# Patient Record
Sex: MALE | Race: WHITE
[De-identification: names, ages, dates, MRNs, and addresses within clinical notes are randomized per-mention and may not be internally consistent; named-entity substitution may affect disease eponyms.]

---

## 2018-12-25 ENCOUNTER — HOSPITAL ENCOUNTER (EMERGENCY)
Dept: HOSPITAL 25 - ED | Age: 68
Discharge: HOME | End: 2018-12-25
Payer: MEDICARE

## 2018-12-25 VITALS — DIASTOLIC BLOOD PRESSURE: 79 MMHG | SYSTOLIC BLOOD PRESSURE: 141 MMHG

## 2018-12-25 DIAGNOSIS — K05.10: Primary | ICD-10-CM

## 2018-12-25 DIAGNOSIS — I10: ICD-10-CM

## 2018-12-25 PROCEDURE — 99282 EMERGENCY DEPT VISIT SF MDM: CPT

## 2018-12-25 NOTE — XMS REPORT
Continuity of Care Document (CCD)

 Created on:2018



Patient:Miguel Robins

Sex:Male

:1950

External Reference #:[id number]





Demographics







 Address  [address]



   [place] [address]

 

 Mobile Phone  [phone]

 

 Email Address  [email or web address]

 

 Preferred Language  en

 

 Marital Status  Not  or 

 

 Jehovah's witness Affiliation  Unknown

 

 Race  White

 

 Ethnic Group  Not  or 









Author







 Name  iBbi Ko









Support







 Name  Relationship  Address  Phone

 

 Janice Robins  Unavailable  [address]  [phone]



     [place] [address]  









Care Team Providers







 Name  Role  Phone

 

 Dev Gutierrez MD  Primary Care Physician  Unavailable









Payers







 Type  Date  Identification Numbers  Payment Provider  Subscriber

 

     Policy Number: [id number]  Medicare  Miguel Robins









 PayID: [id number]  [address]









 [facility], [place] [address]









   Expires: 2018  Policy Number: [id number]  Medicare  Miguel Robins









 PayID: [id number]  [address]









 [place] [address]









     Policy Number: [id number]  [facility]  Miguel Robins









 PayID: [id number]  [address]









 [place] [address]









   Expires: 2017  Policy Number: [id number]  [facility]  Miguel Robins









 PayID: [id number]  [address] [address]









 [place] [address]







Advance Directives







 Description

 

 No Information Available







Problems







 Description

 

 No Information







Family History







 Date  Family Member(s)  Problem(s)  Comments

 

   General  Cancer  

 

   Father  Heart Disease  

 

 :  (age 56  Father   due to  



 Years)    Parkinsons Disease  

 

   Mother  Asthma  

 

   Mother   due to CAD  () - aortic



       stenosisDocument:



       18 - History

 

   Mother  Heart Disease  







Social History







 Type  Date  Description  Comments

 

 Birth Sex    Unknown  

 

 Lives With    Wife  

 

 Occupation    Retired  

 

 ETOH Use    Occasionally consumes alcohol  

 

 Tobacco Use  Start: Unknown  Patient has never smoked  

 

 Smoking Status  Reviewed: 18  Patient has never smoked  

 

 Exercise Type/Frequency    Exercises regularly  







Allergies, Adverse Reactions, Alerts







 Description

 

 No Known Drug Allergies







Medications







 Medication  Date  Status  Form  Strength  Qnty  SIG  Indications  Ordering



                 Provider

 

 Lisinopril    Active    20mg    1 tab    Unknown



   000          daily    

 

 Levothyroxine    Active    50mg    1 tab    Unknown



 Sodium  000          daily    

 

 Aspirin Ec    Active        1 tab    Unknown



   000          daily    

 

 Simvastatin    Active  Tablets  20mg    1 by    Unknown



   000          mouth    



             every day    

 

                 

 

 Gabapentin    Hx  Capsules  300mg  90caps  Take 1    Floyd



   018 -          Capsule    Mecenas,



             By Mouth    MD, DAVID



   018          Once    



             Daily For    



             1 Day And    



             Then 1    



             Twice    



             Daily For    



             1 Day And    



             Then 1    



             Three    



             Times    



             Daily    

 

 Aleve    Hx  Tablets  220mg    2 tablets    Unknown



   000          as needed    



             for pain    

 

 Ibuprofen    Hx  Tablets  600mg    two times    Unknown



   000          a day as    



             needed    

 

 Tramadol HCL    Hx  Tablets  50mg    every 6    Skezas,



   000          hours as    MD Dev



             needed    







Medications Administered in Office







 Medication  Date  Status  Form  Strength  Qnty  SIG  Indications  Ordering



                 Provider

 

 Depomedrol    Administered  Injection          Dirk Radha,



 40MG  018              M.D.

 

 Depomedrol    Administered  Injection          Tan F



 40MG  018              MD Denis

 

 Depomedrol    Administered  Injection          Dirk Radha,



 40MG  018              M.D.

 

 Depomedrol    Administered  Injection          Dirk Radha,



 40MG  018              M.D.

 

 Depomedrol    Administered  Injection          Dirk Radha,



 40MG  017              M.D.

 

 Depomedrol    Administered  Injection          Dirk Radha,



 40MG  017              M.D.

 

 Depomedrol    Administered  Injection          Dirk Radha,



 40MG  017              M.D.

 

 Depomedrol  10/05/2  Administered  Injection          Dirk Radha,



 40MG  016              M.D.

 

 Depomedrol    Administered  Injection          Dirk Radha,



 40MG  016              M.D.







Immunizations







 Description

 

 No Information Available







Vital Signs







 Date  Vital  Result  Comment

 

 2018 10:00am  Height  67 inches  5'7"









 Weight  175.00 lb  

 

 BP Systolic  124 mmHg  

 

 BP Diastolic  71 mmHg  

 

 Respiratory Rate  15 /min  

 

 Body Temperature  97.1 F  

 

 Pain Level  0  

 

 BMI (Body Mass Index)  27.4 kg/m2  









 10/15/2018  1:20pm  Heart Rate  78 /min  









 BP Systolic Sitting  126 mmHg  

 

 BP Diastolic Sitting  78 mmHg  

 

 Respiratory Rate  18 /min  

 

 Body Temperature  97.3 F  









 2018  8:53am  Heart Rate  66 /min  









 BP Systolic Sitting  128 mmHg  

 

 BP Diastolic Sitting  78 mmHg  

 

 Respiratory Rate  16 /min  

 

 Body Temperature  97.9 F  









 2018  4:05pm  Height  67 inches  5'7"









 Weight  175.00 lb  

 

 BP Systolic Sitting  136 mmHg  

 

 BP Diastolic Sitting  96 mmHg  

 

 Respiratory Rate  16 /min  

 

 Body Temperature  97.6 F  

 

 Pain Level  4  

 

 BMI (Body Mass Index)  27.4 kg/m2  









 2018  8:37am  Heart Rate  72 /min  









 BP Systolic  126 mmHg  

 

 BP Diastolic  82 mmHg  

 

 Respiratory Rate  16 /min  

 

 Body Temperature  96.7 F  









 2018  2:05pm  Heart Rate  66 /min  









 BP Systolic Sitting  124 mmHg  

 

 BP Diastolic Sitting  78 mmHg  

 

 Respiratory Rate  18 /min  

 

 Body Temperature  97.6 F  









 2018 11:19am  Heart Rate  96 /min  









 BP Systolic Sitting  118 mmHg  

 

 BP Diastolic Sitting  78 mmHg  

 

 Respiratory Rate  18 /min  

 

 Body Temperature  97.0 F  









 2018 10:14am  Height  67 inches  5'7"









 Weight  175.00 lb  

 

 Heart Rate  80 /min  

 

 BP Systolic  128 mmHg  

 

 BP Diastolic  80 mmHg  

 

 Respiratory Rate  16 /min  

 

 Body Temperature  97.8 F  

 

 BMI (Body Mass Index)  27.4 kg/m2  









 2018  3:15pm  Height  66 inches  5'6"









 Weight  181.00 lb  

 

 Heart Rate  78 /min  

 

 BP Systolic Sitting  108 mmHg  

 

 BP Diastolic Sitting  78 mmHg  

 

 Respiratory Rate  16 /min  

 

 Body Temperature  97.5 F  

 

 Pain Level  2  

 

 BMI (Body Mass Index)  29.2 kg/m2  









 2018  9:06am  Height  66 inches  5'6"









 Weight  181.00 lb  

 

 BP Systolic  124 mmHg  

 

 BP Diastolic  68 mmHg  

 

 Respiratory Rate  18 /min  

 

 Pain Level  3  

 

 BMI (Body Mass Index)  29.2 kg/m2  









 2017  8:52am  Height  66 inches  5'6"









 Weight  171.00 lb  

 

 Heart Rate  84 /min  

 

 BP Systolic  124 mmHg  

 

 BP Diastolic  84 mmHg  

 

 Body Temperature  96.3 F  

 

 Pain Level  0  

 

 BMI (Body Mass Index)  27.6 kg/m2  









 10/18/2017  2:30pm  Height  66 inches  5'6"









 Weight  171.00 lb  

 

 Heart Rate  88 /min  

 

 BP Systolic  121 mmHg  

 

 BP Diastolic  79 mmHg  

 

 Body Temperature  96.7 F  

 

 Pain Level  2  

 

 BMI (Body Mass Index)  27.6 kg/m2  









 2017  2:23pm  Height  66 inches  5'6"









 Weight  181.00 lb  

 

 BP Systolic  118 mmHg  

 

 BP Diastolic  66 mmHg  

 

 Respiratory Rate  18 /min  

 

 Pain Level  3  

 

 BMI (Body Mass Index)  29.2 kg/m2  









 2017 10:10am  Height  66 inches  5'6"









 Weight  181.00 lb  

 

 Heart Rate  70 /min  

 

 BP Systolic  120 mmHg  

 

 BP Diastolic  82 mmHg  

 

 Respiratory Rate  15 /min  

 

 Body Temperature  97.1 F  

 

 Pain Level  2  

 

 BMI (Body Mass Index)  29.2 kg/m2  









 2017  8:43am  Height  66 inches  5'6"









 Weight  176.00 lb  

 

 Pain Level  6  

 

 BMI (Body Mass Index)  28.4 kg/m2  









 10/27/2016  2:44pm  Height  67 inches  5'7"









 Weight  175.00 lb  

 

 Heart Rate  101 /min  

 

 BP Systolic  129 mmHg  

 

 BP Diastolic  93 mmHg  

 

 BMI (Body Mass Index)  27.4 kg/m2  









 10/05/2016  3:38pm  Height  68 inches  5'8"









 Weight  176.00 lb  

 

 Heart Rate  113 /min  

 

 BP Systolic  121 mmHg  

 

 BP Diastolic  84 mmHg  

 

 BMI (Body Mass Index)  26.8 kg/m2  









 2016  3:01pm  Height  68 inches  5'8"









 Weight  176.00 lb  

 

 Heart Rate  82 /min  

 

 BP Systolic  129 mmHg  

 

 BP Diastolic  85 mmHg  

 

 BMI (Body Mass Index)  26.8 kg/m2  









 2016  3:00pm  Height  68 inches  5'8"









 Weight  176.00 lb  

 

 Pain Level  3  

 

 BMI (Body Mass Index)  26.8 kg/m2  









 2016  8:54am  Height  68 inches  5'8"









 Weight  176.00 lb  

 

 Heart Rate  82 /min  

 

 BP Systolic  132 mmHg  

 

 BP Diastolic  83 mmHg  

 

 BMI (Body Mass Index)  26.8 kg/m2  









 2014  8:10am  Height  68 inches  5'8"









 Weight  176.00 lb  

 

 Heart Rate  77 /min  

 

 BP Systolic  140 mmHg  

 

 BP Diastolic  95 mmHg  

 

 BMI (Body Mass Index)  26.8 kg/m2  









 10/22/2012  9:07am  Height  68 inches  5'8"









 Weight  175.00 lb  

 

 Heart Rate  78 /min  

 

 BP Systolic  135 mmHg  

 

 BP Diastolic  87 mmHg  

 

 BMI (Body Mass Index)  26.6 kg/m2  







Results







 Description

 

 No Information Available







Procedures







 Date  Code  Description  Status

 

 2018  Inject/Drain Joint/Bursa Major W/O US  Completed

 

 2018  Inject/Drain Joint/Bursa Major W/O US  Completed

 

 2018  74321  Dest Lesion Each Addl Lesion 2 Through 14 Each  Completed

 

 2018  11386  Destruction ALL Benign Or Premalignant Lesion (Other Than  
Completed



     [place]  

 

 2018  12645  Laparoscopy, Surgical Repair Initial Inguinal Hernia  
Completed

 

 2018  98893  Laparoscopy, Surgical Repair Initial Inguinal Hernia  
Completed

 

 2018  62931  Inject/Drain Joint/Bursa Major W/O US  Completed

 

 201810  Inject/Drain Joint/Bursa Major W/O US  Completed

 

 201710  Inject/Drain Joint/Bursa Major W/O US  Completed

 

 201710  Inject/Drain Joint/Bursa Major W/O US  Completed

 

 201710  Inject/Drain Joint/Bursa Major W/O US  Completed

 

 10/05/2016  00862  Inject/Drain Joint/Bursa Major W/O US  Completed

 

 201610  Inject/Drain Joint/Bursa Major W/O US  Completed

 

 2014  40415  Xray Knee 3 Views  Completed

 

 2014  95158  Rad Exam; Knee, Ap&L  Completed







Encounters







 Type  Date  Location  Provider  Dx  Diagnosis

 

 Office Visit  10/15/2018  [facility]  Floyd Silver,  R10.31  Right 
lower



   1:30p  Of Derik CRUZ, DAVID    quadrant pain

 

 Office Visit  2018  [facility]  Trey Farooq MD  L82.1  Other 
seborrheic



   9:00a        keratosis









 L81.4  Other melanin hyperpigmentation

 

 L57.0  Actinic keratosis

 

 L56.8  Oth acute skin changes due to ultraviolet radiation









 Office Visit  2018 10:15a  [facility]  Floyd Silver,  K40.90  Unil inguinal



     [facility] Of Derik CRUZ, DAVID    hernia, w/o



           obst or gangr,



           not spcf as



           recur









 R10.31  Right lower quadrant pain









 Office Visit  2017  [facility]  Fausto Pepe,  S76.312A  Strain of



   9:00a  Services Of  MD cid/rehan/tnd post



     C.M.A.      grp at thi lev,



           left thigh, init

 

 Office Visit  10/18/2017  [facility]  Fausto Pepe,  S76.212A  Strain of



   2:30p  Services Of  MD shaila ARANGO.M.A.      musc/fasc/tend



           left thigh, init

 

 [facility]  [facility]  S76.312A  Strain of



   12:41p  Assoc,nicole Mike M.D.    msl/fasc/tnd post



     Hospitalists      grp at [facility],



           left thigh, init









 M79.605  Pain in left leg

 

 I10  Essential (primary) hypertension









 Office Visit  10/05/2017 12:40p  [facility],  M79.605  
Pain in



     Assoc,nicole CRAMER    left leg



     Hospitalists      









 E78.5  Hyperlipidemia, unspecified

 

 S39.92xA  Unspecified injury of lower back, initial encounter

 

 I10  Essential (primary) hypertension









 Office Visit  10/05/2017 11:36a  [facility]  Fausto Pepe,  M79.662  Pain in 
left



     Services Of  MD    lower leg



     C.M.A.      

 

 Office Visit  2017 10:15a  [facility]  Jay Garcia  M17.11  Unilateral



     Services Of  M.D.    primary



     C.M.A.      osteoarthritis,



           right knee









 M23.000  Cystic meniscus, unspecified lateral meniscus, right knee









 Office Visit  10/27/2016  3:00p  [facility]  Iman  M67.441  Ganglion, 
right



     Services Of  SHOAIB Morgan    hand



     C.M.A.      

 

 Office Visit  2016  3:15p  [facility]  Jay Garcia,  M17.11  Unilateral



     Services Of  M.D.    primary



     C.M.A.      osteoarthritis,



           right knee









 M23.000  Cystic meniscus, unspecified lateral meniscus, right knee









 Office Visit  2016  [facility]  Jay Garcia,  M17.11  Unilateral primary



   8:30a  Services Of  M.D.    osteoarthritis,



     C.M.A.      right knee

 

 Office Visit  2014  [facility]  Jay Garcia,  715.16  Osteoarthrosis



   8:00a  Services Of  M.D.    Localized Prim Lower



     C.M.A.      Leg

 

 Office Visit  10/22/2012  [facility]  Katie  719.44  Pain Joint Hand



   8:45a  Services Of  APRIL Leo M.D.    







Plan of Treatment

Future Appointment(s):2019  9:00 am - Trey Farooq MD at [facility] 
Nrihcpmfjuf61/12/2018 - Jay Garcia M.D.M17.11 Unilateral primary osteoarthritis
, right kneeFollow up:Follow up:   As needed  Continue knee exercises  Shoot 
low scores as able

## 2018-12-25 NOTE — ED
Throat Pain/Nasal Congestion





- HPI Summary


HPI Summary: 





This patient is a 68 year old M presenting to [facility] with a chief complaint of 

dental/jaw pain. He visited his dentist who could not see him due to the 

holidays but prescribed him penicillin. His symptoms have not resolved. He 

complains of pain in his molar on the right side, and states the pain has only 

gotten worse. The patient rates the pain 8/10 in severity. 





- History of Current Complaint


Chief Complaint: EDDentalPain


Time Seen by Provider: 12/25/18 22:12


Hx Obtained From: Patient


Severity: Moderate


Associated Signs And Symptoms: Positive: Negative


Cough: None





- Allergies/Home Medications


Allergies/Adverse Reactions: 


 Allergies











Allergy/AdvReac Type Severity Reaction Status Date / Time


 


No Known Allergies Allergy   Verified 06/19/18 11:39














PMH/Surg Hx/FS Hx/Imm Hx


Endocrine/Hematology History: Reports: Hx Thyroid Disease


   Denies: Hx Diabetes


Cardiovascular History: Reports: Hx Hypercholesterolemia, Hx Hypertension - 

controlled with meds


   Denies: Hx Pacemaker/ICD


GI History: Reports: Hx Irritable Bowel - hx of years ago - resolved


 History: Reports: Hx Kidney Stones


   Denies: Hx Renal Disease


Musculoskeletal History: Reports: Hx Arthritis - in back area, Hx Back Problems


Sensory History: Reports: Hx Cataracts - very minor left eye, Hx Contacts or 

Glasses - both - will wear glasses for surgery


   Denies: Hx Hearing Aid


Opthamlomology History: Reports: Hx Cataracts - very minor left eye, Hx 

Contacts or Glasses - both - will wear glasses for surgery


Psychiatric History: 


   Denies: Hx Panic Disorder





- Cancer History


Hx Chemotherapy: No





- Surgical History


Surgery Procedure, Year, and Place: ARTHROSCOPIC RIGHT KNEE-REMOVED CYST 1991.  

KIDNEY STONES REMOVED SEVERAL TIMES.  TONSILS AS CHILD-BROKEN LEG AS CHILD NO 

METAL


Hx Anesthesia Reactions: No


Infectious Disease History: No


Infectious Disease History: 


   Denies: Traveled Outside the [place] in Last 30 Days





- Family History


Known Family History: Positive: Other - [place], parkinson's disease





- Social History


Alcohol Use: Rare


Hx Substance Use: No


Substance Use Type: Reports: None


Smoking Status (MU): Never Smoked Tobacco





Review of Systems


Negative: Fever


Positive: Dental Pain


All Other Systems Reviewed And Are Negative: Yes





Physical Exam





- Summary


Physical Exam Summary: 





VITAL SIGNS: Reviewed.


GENERAL:  Patient is a well-developed and nourished MALE who is lying 

comfortable in the stretcher. Patient is not in any acute respiratory distress.


HEAD AND FACE: No signs of trauma. No ecchymosis, hematomas or skull 

depressions. No sinus tenderness.


EYES: PERRLA, EOMI x 2, No injected conjunctiva, no nystagmus.


EARS: Hearing grossly intact. Ear canals and tympanic membranes are within 

normal limits.


MOUTH: Oropharynx within normal limits. Tenderness over the right lower 

anterior gum.


NECK: Supple, trachea is midline, no adenopathy, no JVD, no carotid bruit, no c-

spine tenderness, neck with full ROM.


CHEST: Symmetric, no tenderness at palpation


LUNGS: Clear to auscultation bilaterally. No wheezing or crackles.


CVS: Regular rate and rhythm, S1 and S2 present, no murmurs or gallops 

appreciated.


ABDOMEN: Soft, non-tender. No signs of distention. No rebound no guarding, and 

no masses palpated. Bowel sounds are normal.


EXTREMITIES: FROM in all major joints, no edema, no cyanosis or clubbing.


NEURO: Alert and oriented x 3. No acute neurological deficits. Speech is normal 

and follows commands.


SKIN: Dry and warm





Triage Information Reviewed: Yes


Vital Signs On Initial Exam: 


 Initial Vitals











Temp Pulse Resp BP Pulse Ox


 


 98.7 F   117   20   179/116   96 


 


 12/25/18 21:32  12/25/18 21:32  12/25/18 21:32  12/25/18 21:32  12/25/18 21:32











Vital Signs Reviewed: Yes





Diagnostics





- Vital Signs


 Vital Signs











  Temp Pulse Resp BP Pulse Ox


 


 12/25/18 21:32  98.7 F  117  20  179/116  96














- Laboratory


Lab Statement: Any lab studies that have been ordered have been reviewed, and 

results considered in the medical decision making process.





EENT Course/Dx





- Course


Course Of Treatment: This patient is a 68 year old M presenting to [facility] with a 

chief complaint of dental/jaw pain. He visited his dentist who could not see 

him due to the holidays but prescribed him penicillin. His symptoms have not 

resolved. He will be discharged and prescribed percocet for his pain and see 

his dentist within 7 days. This plan was discussed with the patient and he was 

agreeable with this plan.





- Diagnoses


Provider Diagnoses: 


 Gingivitis








Discharge





- Sign-Out/Discharge


Documenting (check all that apply): Patient Departure - Discharge





- Discharge Plan


Condition: Stable


Disposition: HOME


Prescriptions: 


Clindamycin Cap(NF) [Clindamycin Cap 300 mg Cap(NF)] 300 mg PO Q6H #30 cap


Patient Education Materials:  Gingivitis (ED)


Referrals: 


Dev Gutierrez MD [Primary Care Provider] - 


Additional Instructions: 


Follow up with dentist within 5-7 days. Take Percocet one tablet every 6 hours 

as needed for pain. Return to ED with any new or worsening symptoms. 





- Attestation Statements


Document Initiated by Scribe: Yes


Documenting Scribe: Deacon Coronado


Provider For Whom Scribe is Documenting (Include Credential): Radha Oneal MD


Scribe Attestation: 


IDeacon, scribed for Radha Oneal MD on 12/25/18 at 3562. 


Status of Scribe Document: Ready